# Patient Record
Sex: FEMALE | Race: BLACK OR AFRICAN AMERICAN | NOT HISPANIC OR LATINO | Employment: OTHER | RURAL
[De-identification: names, ages, dates, MRNs, and addresses within clinical notes are randomized per-mention and may not be internally consistent; named-entity substitution may affect disease eponyms.]

---

## 2018-07-16 ENCOUNTER — OFFICE VISIT CONVERTED (OUTPATIENT)
Dept: CARDIOLOGY | Facility: CLINIC | Age: 53
End: 2018-07-16
Attending: SPECIALIST

## 2018-12-03 ENCOUNTER — OFFICE VISIT CONVERTED (OUTPATIENT)
Dept: CARDIOLOGY | Facility: CLINIC | Age: 53
End: 2018-12-03
Attending: SPECIALIST

## 2019-06-03 ENCOUNTER — CONVERSION ENCOUNTER (OUTPATIENT)
Dept: OTHER | Facility: HOSPITAL | Age: 54
End: 2019-06-03

## 2019-06-03 ENCOUNTER — OFFICE VISIT CONVERTED (OUTPATIENT)
Dept: CARDIOLOGY | Facility: CLINIC | Age: 54
End: 2019-06-03
Attending: SPECIALIST

## 2019-12-02 ENCOUNTER — OFFICE VISIT CONVERTED (OUTPATIENT)
Dept: CARDIOLOGY | Facility: CLINIC | Age: 54
End: 2019-12-02
Attending: SPECIALIST

## 2019-12-02 ENCOUNTER — CONVERSION ENCOUNTER (OUTPATIENT)
Dept: OTHER | Facility: HOSPITAL | Age: 54
End: 2019-12-02

## 2020-08-10 ENCOUNTER — CONVERSION ENCOUNTER (OUTPATIENT)
Dept: OTHER | Facility: HOSPITAL | Age: 55
End: 2020-08-10

## 2020-08-10 ENCOUNTER — OFFICE VISIT CONVERTED (OUTPATIENT)
Dept: CARDIOLOGY | Facility: CLINIC | Age: 55
End: 2020-08-10
Attending: SPECIALIST

## 2021-05-10 ENCOUNTER — CONVERSION ENCOUNTER (OUTPATIENT)
Dept: CARDIOLOGY | Facility: CLINIC | Age: 56
End: 2021-05-10

## 2021-05-10 ENCOUNTER — OFFICE VISIT CONVERTED (OUTPATIENT)
Dept: CARDIOLOGY | Facility: CLINIC | Age: 56
End: 2021-05-10
Attending: SPECIALIST

## 2021-05-13 NOTE — PROGRESS NOTES
"   Progress Note      Patient Name: José Grace   Patient ID: [id number]   Sex: Female   YOB: 1965    Primary Care Provider: Ann SIMS   Referring Provider: Ann SIMS    Visit Date: August 10, 2020    Provider: Kaiser Davis MD   Location: [facility]   Location Address: [address] [place] [address]  [id number]   Location Phone: [phone]          Chief Complaint  · Presence of pacemaker      History Of Present Illness  José Grace is a 54 year old /Black female with a history of pacemaker implantation. Patient denies chest pain. No shortness of breath, PND or orthopnea.   Medications  Meds at present include: Metoprolol 25 mg bid; Eliquis 5 mg bid; Meloxicam 15 mg qd; Lisinopril 40 mg qd; Fluoxetine 20 mg qd; Levothyroxine 25 mcg qd; Vitamin D3 50,000 unit q week; Ariprazole 10 mg qd ; Quetiapine Fumarate 50 mg and 100 mg qhs;Atorvastatin 80 mg qd; Prozac 20 mg qam; Abilify 10 mg qam.   PAST MEDICAL HISTORY: negative for diabetes; positive for hypertension; hyperlipidemia hypothyroidism and pacemaker implantation.   FAMILY HISTORY: negative for diabetes, hypertension; positive for heart disease.   PSYCHO/SOCIAL HISTORY: positive for depression. Denies smoking or alcohol use.       Review of Systems  · Cardiovascular  o Denies  o : palpitations (fast, fluttering, or skipping beats), swelling (feet, ankles, hands), shortness of breath while walking or lying flat, chest pain or angina pectoris   · Respiratory  o Denies  o : chronic or frequent cough, asthma or wheezing      Vitals  Date Time BP Position Site L\R Cuff Size HR RR TEMP (F) WT  HT  BMI kg/m2 BSA m2 O2 Sat HC       08/10/2020 11:40 /100 Sitting    96 - R   238lbs 0oz 4'  10\" 49.74 2.1     08/10/2020 11:40 /101 Sitting    99 - R                 Physical Examination  · Constitutional  o Appearance  o : Alert and Oriented X3. The patient is obese. "   · Respiratory  o Inspection of Chest  o : No chest wall deformities, moving equal  o Auscultation of Lungs  o : Good air entry with vesicular breath sounds  · Cardiovascular  o Heart  o :   § Auscultation of Heart  § : S1 and S2 are regular. No S3. No S4. No murmurs.  o Peripheral Vascular System  o :   § Extremities  § : Peripheral pulses were well felt. No edema. No cyanosis.  · Gastrointestinal  o Abdominal Examination  o : No masses or tenderness noted.           Assessment     IMPRESSION/PLAN    1. Presence of pacemaker. Pacemaker was checked with the  and appears to be functioning normally.   2. Essential hypertension controlled.  Continue current dose of Lisinopril.  3. See me back in 6 months.      MD VIVIAN Feliciano/wt       Plan  · Instructions  o This note was transcribed by Serenity Workman. VIVIAN/wt  o The above service was transcribed by Serenity Workman on my behalf and I attest to the accuracy of the note. VIVIAN            Electronically Signed by: Rina Workman-, -Author on August 11, 2020 09:58:24 AM  Electronically Co-signed by: Kaiser Davis MD -Reviewer on August 13, 2020 08:33:11 PM

## 2021-05-15 VITALS
WEIGHT: 230 LBS | HEIGHT: 58 IN | SYSTOLIC BLOOD PRESSURE: 119 MMHG | HEART RATE: 92 BPM | BODY MASS INDEX: 48.28 KG/M2 | DIASTOLIC BLOOD PRESSURE: 84 MMHG

## 2021-05-15 VITALS
HEART RATE: 92 BPM | SYSTOLIC BLOOD PRESSURE: 153 MMHG | DIASTOLIC BLOOD PRESSURE: 91 MMHG | HEIGHT: 58 IN | WEIGHT: 225 LBS | BODY MASS INDEX: 47.23 KG/M2

## 2021-05-15 VITALS
HEART RATE: 96 BPM | DIASTOLIC BLOOD PRESSURE: 100 MMHG | BODY MASS INDEX: 49.96 KG/M2 | SYSTOLIC BLOOD PRESSURE: 156 MMHG | HEIGHT: 58 IN | WEIGHT: 238 LBS

## 2021-05-16 VITALS
BODY MASS INDEX: 45.34 KG/M2 | WEIGHT: 216 LBS | HEART RATE: 83 BPM | DIASTOLIC BLOOD PRESSURE: 96 MMHG | HEIGHT: 58 IN | SYSTOLIC BLOOD PRESSURE: 121 MMHG

## 2021-05-16 VITALS
HEIGHT: 58 IN | WEIGHT: 220 LBS | BODY MASS INDEX: 46.18 KG/M2 | HEART RATE: 75 BPM | SYSTOLIC BLOOD PRESSURE: 150 MMHG | DIASTOLIC BLOOD PRESSURE: 87 MMHG

## 2021-06-05 NOTE — PROGRESS NOTES
"   Progress Note      Patient Name: José Grace   Patient ID: [id number]   Sex: Female   YOB: 1965    Primary Care Provider: Ann SIMS   Referring Provider: Ann SIMS    Visit Date: May 10, 2021    Provider: Kaiser Davis MD   Location: [facility]   Location Address: [address] [place] [address]  [id number]   Location Phone: [phone]          Chief Complaint  · Presence of pacemaker      History Of Present Illness  José Grace is a 55 year old /Black female with a history of pacemaker implantation. Patient denies chest pain. No shortness of breath, PND or orthopnea. Blood pressure well controlled at home.   Medications  Meds at present include: Metoprolol Succ ER 50 mg bid; Eliquis 5 mg bid; Meloxicam 15 mg qd; Lisinopril 40 mg qd; Fluoxetine 20 mg qd; Levothyroxine 25 mcg qd; Vitamin D3 50,000 unit q week; Ariprazole 10 mg qd ; Quetiapine Fumarate 50 mg and 100 mg qhs;Atorvastatin 80 mg qd; Prozac 20 mg qam; Abilify 10 mg qam.   PAST MEDICAL HISTORY: negative for diabetes; positive for hypertension; hyperlipidemia hypothyroidism and pacemaker implantation.   PSYCHO/SOCIAL HISTORY: Denies smoking or alcohol use.       Review of Systems  · Cardiovascular  o Denies  o : palpitations (fast, fluttering, or skipping beats), swelling (feet, ankles, hands), shortness of breath while walking or lying flat, chest pain or angina pectoris   · Respiratory  o Denies  o : chronic or frequent cough, asthma or wheezing      Vitals  Date Time BP Position Site L\R Cuff Size HR RR TEMP (F) WT  HT  BMI kg/m2 BSA m2 O2 Sat FR L/min FiO2 HC       05/10/2021 08:20 /70 Sitting    70 - R   240lbs 0oz 4'  10\" 50.16 2.11             Physical Examination  · Constitutional  o Appearance  o : Alert and Oriented X3. The patient is obese.   · Respiratory  o Inspection of Chest  o : No chest wall deformities, moving equal  o Auscultation " of Lungs  o : Good air entry with vesicular breath sounds  · Cardiovascular  o Heart  o :   § Auscultation of Heart  § : S1 and S2 are regular. No S3. No S4. No murmurs.  o Peripheral Vascular System  o :   § Extremities  § : Peripheral pulses were well felt. No edema. No cyanosis.  · Gastrointestinal  o Abdominal Examination  o : No masses or tenderness noted.           Assessment     IMPRESSION/PLAN    1.  Sick sinus syndrome. Presence of pacemaker. Pacemaker was checked with the  and appears to be functioning normally.   2. Essential hypertension controlled.  Continue current dose of Lisinopril.  3. See me back in 6 months.      MD VIVIAN Feliciano/wt       Plan  · Instructions  o This note was transcribed by Serenity Workman. VIVIAN/wt  o The above service was transcribed by Serenity Workman on my behalf and I attest to the accuracy of the note. VIVIAN            Electronically Signed by: Rina Workman-, -Author on May 12, 2021 08:22:54 AM  Electronically Co-signed by: Kaiser Davis MD -Reviewer on May 14, 2021 10:28:01 AM

## 2021-07-15 VITALS
SYSTOLIC BLOOD PRESSURE: 110 MMHG | HEIGHT: 58 IN | BODY MASS INDEX: 50.38 KG/M2 | WEIGHT: 240 LBS | DIASTOLIC BLOOD PRESSURE: 70 MMHG | HEART RATE: 70 BPM

## 2021-09-19 NOTE — PROGRESS NOTES
[facility]  Cardiology progress Note    Patient Name: José Grace  : 1965    CHIEF COMPLAINT  Hypertension, presence of pacemaker.  Generalized edema.      Subjective   Subjective     HISTORY OF PRESENT ILLNESS    José Grace is a 55 y.o. female with history of hypertension and pacemaker implantation.  Several generalized edema for the last few months.  Started on Lasix.  No shortness of breath.    Review of Systems:   Constitutional no fever,  no weight loss   Skin no rash   Otolaryngeal no difficulty swallowing   Cardiovascular See HPI   Pulmonary no cough, no sputum production   Gastrointestinal no constipation, no diarrhea   Genitourinary no dysuria, no hematuria   Hematologic no easy bruisability, no abnormal bleeding   Musculoskeletal no muscle pain   Neurologic no dizziness, no falls         Personal History     Social History:  reports that she has never smoked. She has never used smokeless tobacco. She reports that she does not drink alcohol and does not use drugs.    Home Medications:  Current Outpatient Medications on File Prior to Visit   Medication Sig   • apixaban (ELIQUIS) 5 MG tablet tablet Take 5 mg by mouth 2 (Two) Times a Day.   • ARIPiprazole (ABILIFY) 10 MG tablet Take 10 mg by mouth Daily.   • atorvastatin (LIPITOR) 80 MG tablet Take 80 mg by mouth Daily.   • Cholecalciferol (Vitamin D3) 50 MCG (2000 UT) tablet Take  by mouth.   • ferrous sulfate 325 (65 FE) MG EC tablet Take 325 mg by mouth 3 (Three) Times a Day With Meals.   • FLUoxetine (PROzac) 20 MG capsule Take 20 mg by mouth Daily.   • furosemide (LASIX) 20 MG tablet Take 20 mg by mouth 2 (Two) Times a Day.   • levothyroxine (SYNTHROID, LEVOTHROID) 25 MCG tablet Take 25 mcg by mouth Daily.   • lisinopril (PRINIVIL,ZESTRIL) 40 MG tablet Take 40 mg by mouth Daily.   • Metoprolol Succinate 50 MG capsule extended-release 24 hour sprinkle Take 50 mg by mouth 2 (two) times a day.   • potassium chloride 10  MEQ CR tablet Take 10 mEq by mouth 2 (Two) Times a Day.   • QUEtiapine (SEROquel) 50 MG tablet Take 50 mg by mouth Every Night.     No current facility-administered medications on file prior to visit.     Allergies:  No Known Allergies    Objective    Objective       Vitals:   Heart Rate:  [93-98] 93  BP: (165-170)/(112-120) 170/112  Body mass index is 54.13 kg/m².     Physical Exam:   Constitutional: Awake, alert, No acute distress    Eyes: PERRLA, sclerae anicteric, no conjunctival injection   HENT: NCAT, mucous membranes moist   Neck: Supple, no thyromegaly, no lymphadenopathy, trachea midline   Respiratory: Clear to auscultation bilaterally, nonlabored respirations    Cardiovascular: RRR, no murmurs or rubs. Palpable pedal pulses bilaterally   Musculoskeletal: No bilateral ankle edema, no cyanosis to extremities   Psychiatric: Appropriate affect, cooperative   Neurologic: Oriented x 3, strength symmetric in all extremities, Cranial Nerves grossly intact to confrontation, speech clear   Skin: No rashes.    Result Review    Result Review:  I have personally reviewed the available results from  [x]  Laboratory  [x]  EKG  [x]  Cardiology  [x]  Medications  [x]  Old records  []  Other:   Procedures    Impression/Plan:  1.  Essential hypertension Uncontrolled: Continue lisinopril.  Low-salt diet advised.  Monitor blood pressure regularly.  Be on a low-salt diet.    2.  Sick sinus syndrome status post pacemaker: Recent pacemaker check showed pacemaker is functioning normally.  3.  Chronic diastolic heart failure: Low-salt diet fluid restriction advised.  Continue Lasix.  Check BNP and BMP levels.  Echocardiogram to evaluate left ventricular systolic function.  4.  Anemia: Managed by her hematologist.  5.  Morbid obesity: Low-fat diet regular exercise advised.        Kaiser Davis MD   09/20/21   14:36 EDT

## 2021-09-20 ENCOUNTER — OFFICE VISIT (OUTPATIENT)
Dept: CARDIOLOGY | Facility: CLINIC | Age: 56
End: 2021-09-20

## 2021-09-20 VITALS
DIASTOLIC BLOOD PRESSURE: 112 MMHG | HEART RATE: 93 BPM | WEIGHT: 259 LBS | HEIGHT: 58 IN | SYSTOLIC BLOOD PRESSURE: 170 MMHG | BODY MASS INDEX: 54.37 KG/M2

## 2021-09-20 DIAGNOSIS — I10 HYPERTENSION, ESSENTIAL: Primary | ICD-10-CM

## 2021-09-20 DIAGNOSIS — Z95.0 PRESENCE OF PERMANENT CARDIAC PACEMAKER: ICD-10-CM

## 2021-09-20 DIAGNOSIS — I50.32 DIASTOLIC CHF, CHRONIC (HCC): ICD-10-CM

## 2021-09-20 PROCEDURE — 99214 OFFICE O/P EST MOD 30 MIN: CPT | Performed by: SPECIALIST

## 2021-09-20 RX ORDER — ARIPIPRAZOLE 10 MG/1
10 TABLET ORAL DAILY
COMMUNITY

## 2021-09-20 RX ORDER — LANOLIN ALCOHOL/MO/W.PET/CERES
325 CREAM (GRAM) TOPICAL
COMMUNITY

## 2021-09-20 RX ORDER — POTASSIUM CHLORIDE 750 MG/1
10 TABLET, FILM COATED, EXTENDED RELEASE ORAL DAILY
COMMUNITY

## 2021-09-20 RX ORDER — FUROSEMIDE 20 MG/1
20 TABLET ORAL 2 TIMES DAILY
COMMUNITY

## 2021-09-20 RX ORDER — CHOLECALCIFEROL (VITAMIN D3) 125 MCG
CAPSULE ORAL
COMMUNITY
End: 2022-04-11

## 2021-09-20 RX ORDER — FLUOXETINE HYDROCHLORIDE 20 MG/1
20 CAPSULE ORAL DAILY
COMMUNITY

## 2021-09-20 RX ORDER — QUETIAPINE FUMARATE 50 MG/1
50 TABLET, FILM COATED ORAL 3 TIMES DAILY
COMMUNITY

## 2021-09-20 RX ORDER — LEVOTHYROXINE SODIUM 0.03 MG/1
25 TABLET ORAL DAILY
COMMUNITY

## 2021-09-20 RX ORDER — ATORVASTATIN CALCIUM 80 MG/1
80 TABLET, FILM COATED ORAL DAILY
COMMUNITY

## 2021-09-20 RX ORDER — LISINOPRIL 40 MG/1
40 TABLET ORAL DAILY
COMMUNITY

## 2021-11-24 ENCOUNTER — TELEPHONE (OUTPATIENT)
Dept: CARDIOLOGY | Facility: CLINIC | Age: 56
End: 2021-11-24

## 2021-11-24 NOTE — TELEPHONE ENCOUNTER
----- Message from Kaiser Davis MD sent at 11/23/2021  9:56 AM EST -----  Notify pt echocardiogram shows normal heart function and no significant valve abnormality. Keep follow up as scheduled.

## 2022-04-08 NOTE — PROGRESS NOTES
[facility]  Cardiology progress Note    Patient Name: José Grace  : 1965    CHIEF COMPLAINT  Presence of pacemaker.      Subjective   Subjective     HISTORY OF PRESENT ILLNESS    José Grace is a 56 y.o. female with sick sinus syndrome status post permanent pacemaker.  No chest pain or shortness of breath.    Review of Systems:   Constitutional no fever,  no weight loss   Skin no rash   Otolaryngeal no difficulty swallowing   Cardiovascular See HPI   Pulmonary no cough, no sputum production   Gastrointestinal no constipation, no diarrhea   Genitourinary no dysuria, no hematuria   Hematologic no easy bruisability, no abnormal bleeding   Musculoskeletal no muscle pain   Neurologic no dizziness, no falls         Personal History     Social History:  reports that she has never smoked. She has never used smokeless tobacco. She reports that she does not drink alcohol and does not use drugs.    Home Medications:  Current Outpatient Medications on File Prior to Visit   Medication Sig   • apixaban (ELIQUIS) 5 MG tablet tablet Take 5 mg by mouth 2 (Two) Times a Day.   • ARIPiprazole (ABILIFY) 10 MG tablet Take 10 mg by mouth Daily.   • atorvastatin (LIPITOR) 80 MG tablet Take 80 mg by mouth Daily.   • D3 Super Strength 50 MCG (2000 UT) capsule TAKE TWO SOFTGELS BY MOUTH EVERY DAY   • ferrous sulfate 325 (65 FE) MG EC tablet Take 325 mg by mouth 3 (Three) Times a Day With Meals.   • FLUoxetine (PROzac) 20 MG capsule Take 20 mg by mouth Daily.   • furosemide (LASIX) 20 MG tablet Take 20 mg by mouth 2 (Two) Times a Day.   • levothyroxine (SYNTHROID, LEVOTHROID) 25 MCG tablet Take 25 mcg by mouth Daily.   • lisinopril (PRINIVIL,ZESTRIL) 40 MG tablet Take 40 mg by mouth Daily.   • Metoprolol Succinate 50 MG capsule extended-release 24 hour sprinkle Take 50 mg by mouth 2 (two) times a day.   • potassium chloride 10 MEQ CR tablet Take 10 mEq by mouth Daily.   • QUEtiapine (SEROquel) 50 MG tablet  Take 50 mg by mouth 3 (Three) Times a Day.   • [DISCONTINUED] Cholecalciferol (Vitamin D3) 50 MCG (2000 UT) tablet Take  by mouth.     No current facility-administered medications on file prior to visit.     Allergies:  No Known Allergies    Objective    Objective       Vitals:   Heart Rate:  [] 107  BP: (139-147)/(82-84) 139/84  Body mass index is 52.31 kg/m².     Physical Exam:   Constitutional: Awake, alert, No acute distress    Eyes: PERRLA, sclerae anicteric, no conjunctival injection   HENT: NCAT, mucous membranes moist   Neck: Supple, no thyromegaly, no lymphadenopathy, trachea midline   Respiratory: Clear to auscultation bilaterally, nonlabored respirations    Cardiovascular: RRR, no murmurs or rubs. Palpable pedal pulses bilaterally   Musculoskeletal: No bilateral ankle edema, no cyanosis to extremities   Psychiatric: Appropriate affect, cooperative   Neurologic: Oriented x 3, strength symmetric in all extremities, Cranial Nerves grossly intact to confrontation, speech clear   Skin: No rashes.    Result Review    Result Review:  I have personally reviewed the available results from  [x]  Laboratory  [x]  EKG  [x]  Cardiology  [x]  Medications  [x]  Old records  []  Other:   Procedures  Results for orders placed in visit on 11/22/21    Adult Transthoracic Echo Complete W/ Cont if Necessary Per Protocol    Interpretation Summary  Normal left ventricular systolic function.  Trace aortic regurgitation.     Impression/Plan:  1.  Sick sinus syndrome status post permanent pacemaker: Pacemaker check showed pacemaker is functioning normally.  2.  Essential hypertension controlled: Continue lisinopril 40 mg a day.  3.  Paroxysmal atrial fibrillation: Continue Toprol-XL 50 mg twice daily.  Continue Eliquis 5 mg BID.  Able to tolerate Eliquis without any side effects.  4.  Hyperlipidemia: Continue Lipitor 80 mg once a day.        Kaiser Davis MD   04/11/22   11:01 EDT

## 2022-04-11 ENCOUNTER — OFFICE VISIT (OUTPATIENT)
Dept: CARDIOLOGY | Facility: CLINIC | Age: 57
End: 2022-04-11

## 2022-04-11 ENCOUNTER — CLINICAL SUPPORT NO REQUIREMENTS (OUTPATIENT)
Dept: CARDIOLOGY | Facility: CLINIC | Age: 57
End: 2022-04-11

## 2022-04-11 VITALS
HEART RATE: 107 BPM | WEIGHT: 259 LBS | HEIGHT: 59 IN | BODY MASS INDEX: 52.21 KG/M2 | SYSTOLIC BLOOD PRESSURE: 139 MMHG | DIASTOLIC BLOOD PRESSURE: 84 MMHG

## 2022-04-11 DIAGNOSIS — Z95.0 PRESENCE OF PERMANENT CARDIAC PACEMAKER: Primary | ICD-10-CM

## 2022-04-11 DIAGNOSIS — Z95.810 PRESENCE OF IMPLANTABLE CARDIOVERTER-DEFIBRILLATOR (ICD): Primary | ICD-10-CM

## 2022-04-11 DIAGNOSIS — I10 HYPERTENSION, ESSENTIAL: ICD-10-CM

## 2022-04-11 DIAGNOSIS — I49.5 SSS (SICK SINUS SYNDROME): ICD-10-CM

## 2022-04-11 PROCEDURE — 93279 PRGRMG DEV EVAL PM/LDLS PM: CPT | Performed by: SPECIALIST

## 2022-04-11 PROCEDURE — 99214 OFFICE O/P EST MOD 30 MIN: CPT | Performed by: SPECIALIST

## 2022-04-11 RX ORDER — CHOLECALCIFEROL (VITAMIN D3) 50 MCG
CAPSULE ORAL
COMMUNITY
Start: 2022-03-17

## 2022-04-11 NOTE — PROGRESS NOTES
Normal VVIR Chamber Pacemaker Device Interrogation and Device Testing.  Normal evaluation of device function and lead measurements.  No optimization was needed of parameters or maximization of device longevity.  Remaining battery is 25 months, voltage is 2.72.

## 2024-04-11 ENCOUNTER — APPOINTMENT (RX ONLY)
Dept: URBAN - METROPOLITAN AREA CLINIC 94 | Facility: CLINIC | Age: 59
Setting detail: DERMATOLOGY
End: 2024-04-11

## 2024-04-11 VITALS — HEIGHT: 57 IN | WEIGHT: 221 LBS

## 2024-04-11 DIAGNOSIS — L63.8 OTHER ALOPECIA AREATA: ICD-10-CM | Status: INADEQUATELY CONTROLLED

## 2024-04-11 PROCEDURE — 99203 OFFICE O/P NEW LOW 30 MIN: CPT

## 2024-04-11 PROCEDURE — ? TREATMENT REGIMEN

## 2024-04-11 PROCEDURE — ? COUNSELING

## 2024-04-11 PROCEDURE — ? PRESCRIPTION

## 2024-04-11 RX ORDER — CLOBETASOL PROPIONATE 0.5 MG/G
OINTMENT TOPICAL
Qty: 45 | Refills: 0 | Status: ERX | COMMUNITY
Start: 2024-04-11

## 2024-04-11 RX ADMIN — CLOBETASOL PROPIONATE: 0.5 OINTMENT TOPICAL at 00:00

## 2024-04-11 ASSESSMENT — LOCATION SIMPLE DESCRIPTION DERM
LOCATION SIMPLE: SCALP
LOCATION SIMPLE: SCALP
LOCATION SIMPLE: RIGHT FOREHEAD

## 2024-04-11 ASSESSMENT — LOCATION DETAILED DESCRIPTION DERM
LOCATION DETAILED: LEFT CENTRAL FRONTAL SCALP
LOCATION DETAILED: RIGHT LATERAL FOREHEAD
LOCATION DETAILED: RIGHT CENTRAL FRONTAL SCALP
LOCATION DETAILED: RIGHT SUPERIOR PARIETAL SCALP
LOCATION DETAILED: LEFT CENTRAL FRONTAL SCALP

## 2024-04-11 ASSESSMENT — LOCATION ZONE DERM
LOCATION ZONE: FACE
LOCATION ZONE: SCALP
LOCATION ZONE: SCALP

## 2024-04-11 NOTE — PROCEDURE: TREATMENT REGIMEN
Gastroenterology
Initiate Treatment: clobetasol 0.05 % topical ointment: Apply to affected areas on scalp twice daily
Detail Level: Zone

## 2024-05-14 ENCOUNTER — APPOINTMENT (RX ONLY)
Dept: URBAN - METROPOLITAN AREA CLINIC 94 | Facility: CLINIC | Age: 59
Setting detail: DERMATOLOGY
End: 2024-05-14

## 2024-05-14 DIAGNOSIS — L63.8 OTHER ALOPECIA AREATA: ICD-10-CM | Status: IMPROVED

## 2024-05-14 PROCEDURE — ? COUNSELING

## 2024-05-14 PROCEDURE — ? TREATMENT REGIMEN

## 2024-05-14 PROCEDURE — 99214 OFFICE O/P EST MOD 30 MIN: CPT

## 2024-05-14 PROCEDURE — ? PRESCRIPTION

## 2024-05-14 RX ORDER — MINOXIDIL 50 MG/G
AEROSOL, FOAM TOPICAL
Qty: 60 | Refills: 0 | Status: ERX | COMMUNITY
Start: 2024-05-14

## 2024-05-14 RX ADMIN — MINOXIDIL: 50 AEROSOL, FOAM TOPICAL at 00:00

## 2024-05-14 ASSESSMENT — LOCATION SIMPLE DESCRIPTION DERM
LOCATION SIMPLE: RIGHT FOREHEAD
LOCATION SIMPLE: SCALP
LOCATION SIMPLE: SCALP

## 2024-05-14 ASSESSMENT — LOCATION ZONE DERM
LOCATION ZONE: SCALP
LOCATION ZONE: FACE
LOCATION ZONE: SCALP

## 2024-05-14 ASSESSMENT — LOCATION DETAILED DESCRIPTION DERM
LOCATION DETAILED: LEFT CENTRAL FRONTAL SCALP
LOCATION DETAILED: RIGHT CENTRAL FRONTAL SCALP
LOCATION DETAILED: LEFT CENTRAL FRONTAL SCALP
LOCATION DETAILED: RIGHT SUPERIOR PARIETAL SCALP
LOCATION DETAILED: RIGHT LATERAL FOREHEAD

## 2024-05-14 NOTE — PROCEDURE: TREATMENT REGIMEN
Initiate Treatment: minoxidil 5 % topical foam : Apply twice daily to affected areas on scalp.
Continue Regimen: clobetasol 0.05 % topical ointment: Apply to affected areas on scalp twice daily
Detail Level: Zone

## 2024-10-01 ENCOUNTER — APPOINTMENT (RX ONLY)
Dept: URBAN - METROPOLITAN AREA CLINIC 94 | Facility: CLINIC | Age: 59
Setting detail: DERMATOLOGY
End: 2024-10-01

## 2024-10-01 VITALS — HEIGHT: 58 IN | WEIGHT: 207 LBS

## 2024-10-01 DIAGNOSIS — L63.8 OTHER ALOPECIA AREATA: ICD-10-CM

## 2024-10-01 PROCEDURE — ? PRESCRIPTION

## 2024-10-01 PROCEDURE — 99214 OFFICE O/P EST MOD 30 MIN: CPT

## 2024-10-01 PROCEDURE — ? TREATMENT REGIMEN

## 2024-10-01 PROCEDURE — ? COUNSELING

## 2024-10-01 RX ORDER — CLOBETASOL PROPIONATE 0.5 MG/ML
SOLUTION TOPICAL
Qty: 50 | Refills: 3 | Status: ERX | COMMUNITY
Start: 2024-10-01

## 2024-10-01 RX ORDER — MINOXIDIL 50 MG/G
AEROSOL, FOAM TOPICAL
Qty: 60 | Refills: 0 | Status: ERX

## 2024-10-01 RX ADMIN — CLOBETASOL PROPIONATE: 0.5 SOLUTION TOPICAL at 00:00

## 2024-10-01 ASSESSMENT — LOCATION ZONE DERM
LOCATION ZONE: SCALP
LOCATION ZONE: FACE
LOCATION ZONE: SCALP

## 2024-10-01 ASSESSMENT — LOCATION DETAILED DESCRIPTION DERM
LOCATION DETAILED: RIGHT SUPERIOR PARIETAL SCALP
LOCATION DETAILED: LEFT CENTRAL FRONTAL SCALP
LOCATION DETAILED: RIGHT CENTRAL FRONTAL SCALP
LOCATION DETAILED: RIGHT LATERAL FOREHEAD
LOCATION DETAILED: LEFT CENTRAL FRONTAL SCALP

## 2024-10-01 ASSESSMENT — LOCATION SIMPLE DESCRIPTION DERM
LOCATION SIMPLE: SCALP
LOCATION SIMPLE: RIGHT FOREHEAD
LOCATION SIMPLE: SCALP

## 2024-10-01 NOTE — PROCEDURE: TREATMENT REGIMEN
Discontinue Regimen: minoxidil 5 % topical foam (pcp discontinued med)
Continue Regimen: clobetasol 0.05 % topical ointment: Apply to affected areas on scalp twice daily
Detail Level: Zone
Initiate Treatment: minoxidil 5 % topical foam : Apply twice daily to affected areas on scalp.